# Patient Record
Sex: FEMALE | Race: BLACK OR AFRICAN AMERICAN | URBAN - METROPOLITAN AREA
[De-identification: names, ages, dates, MRNs, and addresses within clinical notes are randomized per-mention and may not be internally consistent; named-entity substitution may affect disease eponyms.]

---

## 2019-01-03 ENCOUNTER — OP HISTORICAL/CONVERTED ENCOUNTER (OUTPATIENT)
Dept: OTHER | Age: 84
End: 2019-01-03

## 2021-02-26 ENCOUNTER — APPOINTMENT (OUTPATIENT)
Dept: GENERAL RADIOLOGY | Age: 86
End: 2021-02-26
Attending: EMERGENCY MEDICINE
Payer: MEDICARE

## 2021-02-26 ENCOUNTER — HOSPITAL ENCOUNTER (EMERGENCY)
Age: 86
Discharge: HOME | End: 2021-02-26
Attending: EMERGENCY MEDICINE
Payer: MEDICARE

## 2021-02-26 VITALS
DIASTOLIC BLOOD PRESSURE: 61 MMHG | RESPIRATION RATE: 18 BRPM | HEART RATE: 69 BPM | TEMPERATURE: 98.2 F | SYSTOLIC BLOOD PRESSURE: 132 MMHG | OXYGEN SATURATION: 98 %

## 2021-02-26 DIAGNOSIS — S39.012A STRAIN OF MUSCLE, FASCIA AND TENDON OF LOWER BACK, INITIAL ENCOUNTER: ICD-10-CM

## 2021-02-26 DIAGNOSIS — G44.209 MUSCLE CONTRACTION HEADACHE: ICD-10-CM

## 2021-02-26 DIAGNOSIS — S46.812A TRAPEZIUS MUSCLE STRAIN, LEFT, INITIAL ENCOUNTER: Primary | ICD-10-CM

## 2021-02-26 LAB
ANION GAP SERPL CALC-SCNC: 8 MMOL/L (ref 5–15)
APPEARANCE UR: CLEAR
ATRIAL RATE: 67 BPM
BACTERIA URNS QL MICRO: NEGATIVE /HPF
BASOPHILS # BLD: 0 K/UL (ref 0–0.1)
BASOPHILS NFR BLD: 0 % (ref 0–1)
BILIRUB UR QL: NEGATIVE
BUN SERPL-MCNC: 12 MG/DL (ref 6–20)
BUN/CREAT SERPL: 12 (ref 12–20)
CA-I BLD-MCNC: 9.3 MG/DL (ref 8.5–10.1)
CALCULATED P AXIS, ECG09: 63 DEGREES
CALCULATED R AXIS, ECG10: -26 DEGREES
CALCULATED T AXIS, ECG11: -8 DEGREES
CHLORIDE SERPL-SCNC: 106 MMOL/L (ref 97–108)
CO2 SERPL-SCNC: 27 MMOL/L (ref 21–32)
COLOR UR: NORMAL
CREAT SERPL-MCNC: 0.97 MG/DL (ref 0.55–1.02)
DIAGNOSIS, 93000: NORMAL
DIFFERENTIAL METHOD BLD: NORMAL
EOSINOPHIL # BLD: 0 K/UL (ref 0–0.4)
EOSINOPHIL NFR BLD: 0 % (ref 0–7)
ERYTHROCYTE [DISTWIDTH] IN BLOOD BY AUTOMATED COUNT: 14.5 % (ref 11.5–14.5)
GLUCOSE BLD STRIP.AUTO-MCNC: 90 MG/DL (ref 65–100)
GLUCOSE SERPL-MCNC: 94 MG/DL (ref 65–100)
GLUCOSE UR STRIP.AUTO-MCNC: NEGATIVE MG/DL
HCT VFR BLD AUTO: 41.3 % (ref 35–47)
HGB BLD-MCNC: 12.9 G/DL (ref 11.5–16)
HGB UR QL STRIP: NEGATIVE
IMM GRANULOCYTES # BLD AUTO: 0 K/UL (ref 0–0.04)
IMM GRANULOCYTES NFR BLD AUTO: 0 % (ref 0–0.5)
KETONES UR QL STRIP.AUTO: NEGATIVE MG/DL
LEUKOCYTE ESTERASE UR QL STRIP.AUTO: NEGATIVE
LYMPHOCYTES # BLD: 1.1 K/UL (ref 0.8–3.5)
LYMPHOCYTES NFR BLD: 24 % (ref 12–49)
MCH RBC QN AUTO: 28.4 PG (ref 26–34)
MCHC RBC AUTO-ENTMCNC: 31.2 G/DL (ref 30–36.5)
MCV RBC AUTO: 91 FL (ref 80–99)
MONOCYTES # BLD: 0.4 K/UL (ref 0–1)
MONOCYTES NFR BLD: 9 % (ref 5–13)
NEUTS SEG # BLD: 3 K/UL (ref 1.8–8)
NEUTS SEG NFR BLD: 67 % (ref 32–75)
NITRITE UR QL STRIP.AUTO: NEGATIVE
NRBC # BLD: 0 K/UL (ref 0–0.01)
NRBC BLD-RTO: 0 PER 100 WBC
P-R INTERVAL, ECG05: 148 MS
PERFORMED BY, TECHID: NORMAL
PH UR STRIP: 7 [PH] (ref 5–8)
PLATELET # BLD AUTO: 215 K/UL (ref 150–400)
PMV BLD AUTO: 10.1 FL (ref 8.9–12.9)
POTASSIUM SERPL-SCNC: 4.1 MMOL/L (ref 3.5–5.1)
PROT UR STRIP-MCNC: NEGATIVE MG/DL
Q-T INTERVAL, ECG07: 432 MS
QRS DURATION, ECG06: 130 MS
QTC CALCULATION (BEZET), ECG08: 456 MS
RBC # BLD AUTO: 4.54 M/UL (ref 3.8–5.2)
RBC #/AREA URNS HPF: NORMAL /HPF (ref 0–5)
SODIUM SERPL-SCNC: 141 MMOL/L (ref 136–145)
SP GR UR REFRACTOMETRY: <1.005 (ref 1–1.03)
UROBILINOGEN UR QL STRIP.AUTO: 0.1 EU/DL (ref 0.1–1)
VENTRICULAR RATE, ECG03: 67 BPM
WBC # BLD AUTO: 4.5 K/UL (ref 3.6–11)
WBC URNS QL MICRO: NORMAL /HPF (ref 0–4)

## 2021-02-26 PROCEDURE — 74018 RADEX ABDOMEN 1 VIEW: CPT

## 2021-02-26 PROCEDURE — 96374 THER/PROPH/DIAG INJ IV PUSH: CPT

## 2021-02-26 PROCEDURE — 82962 GLUCOSE BLOOD TEST: CPT

## 2021-02-26 PROCEDURE — 85025 COMPLETE CBC W/AUTO DIFF WBC: CPT

## 2021-02-26 PROCEDURE — 93005 ELECTROCARDIOGRAM TRACING: CPT

## 2021-02-26 PROCEDURE — 74011250636 HC RX REV CODE- 250/636: Performed by: EMERGENCY MEDICINE

## 2021-02-26 PROCEDURE — A9270 NON-COVERED ITEM OR SERVICE: HCPCS | Performed by: EMERGENCY MEDICINE

## 2021-02-26 PROCEDURE — 74011250637 HC RX REV CODE- 250/637: Performed by: EMERGENCY MEDICINE

## 2021-02-26 PROCEDURE — 36415 COLL VENOUS BLD VENIPUNCTURE: CPT

## 2021-02-26 PROCEDURE — 74011636637 HC RX REV CODE- 636/637: Performed by: EMERGENCY MEDICINE

## 2021-02-26 PROCEDURE — 80048 BASIC METABOLIC PNL TOTAL CA: CPT

## 2021-02-26 PROCEDURE — 99285 EMERGENCY DEPT VISIT HI MDM: CPT

## 2021-02-26 PROCEDURE — 81003 URINALYSIS AUTO W/O SCOPE: CPT

## 2021-02-26 PROCEDURE — 71045 X-RAY EXAM CHEST 1 VIEW: CPT

## 2021-02-26 RX ORDER — HYDROCHLOROTHIAZIDE 25 MG/1
25 TABLET ORAL DAILY
COMMUNITY

## 2021-02-26 RX ORDER — OXYCODONE HYDROCHLORIDE 5 MG/1
2.5 TABLET ORAL
Qty: 12 TAB | Refills: 0 | Status: SHIPPED | OUTPATIENT
Start: 2021-02-26 | End: 2021-03-01

## 2021-02-26 RX ORDER — KETOROLAC TROMETHAMINE 15 MG/ML
15 INJECTION, SOLUTION INTRAMUSCULAR; INTRAVENOUS ONCE
Status: COMPLETED | OUTPATIENT
Start: 2021-02-26 | End: 2021-02-26

## 2021-02-26 RX ORDER — ADHESIVE BANDAGE
15 BANDAGE TOPICAL
Qty: 100 ML | Refills: 1 | Status: SHIPPED | OUTPATIENT
Start: 2021-02-26

## 2021-02-26 RX ORDER — AMLODIPINE BESYLATE 10 MG/1
10 TABLET ORAL DAILY
COMMUNITY

## 2021-02-26 RX ORDER — ACETAMINOPHEN 500 MG
1000 TABLET ORAL ONCE
Status: COMPLETED | OUTPATIENT
Start: 2021-02-26 | End: 2021-02-26

## 2021-02-26 RX ORDER — PREDNISONE 20 MG/1
20 TABLET ORAL ONCE
Status: COMPLETED | OUTPATIENT
Start: 2021-02-26 | End: 2021-02-26

## 2021-02-26 RX ORDER — DONEPEZIL HYDROCHLORIDE 10 MG/1
10 TABLET, FILM COATED ORAL
COMMUNITY

## 2021-02-26 RX ADMIN — KETOROLAC TROMETHAMINE 15 MG: 15 INJECTION, SOLUTION INTRAMUSCULAR; INTRAVENOUS at 17:51

## 2021-02-26 RX ADMIN — ACETAMINOPHEN 1000 MG: 500 TABLET ORAL at 17:51

## 2021-02-26 RX ADMIN — PREDNISONE 20 MG: 20 TABLET ORAL at 17:51

## 2021-02-26 NOTE — ED TRIAGE NOTES
Pt arrived to ED via EMS from home with family reporting pt has headache and back pain. Pt only complaint is lower back pain x1 week. History of dementia. Poor historian.

## 2021-02-26 NOTE — ED PROVIDER NOTES
EMERGENCY DEPARTMENT HISTORY AND PHYSICAL EXAM        Date: 2/26/2021  Patient Name: Serenity Trinidad    History of Presenting Illness     Chief Complaint   Patient presents with    Headache    Back Pain       5:02 PM    History Provided By: Patient    HPI: Serenity Trinidad, 80 y.o. female with a history of dementia presents to the ED via EMS c/o HA that began 2-3 days ago. Pt endorses that the pain is worsened with palpations and turning her head. Pt also reports back pain that worsened when turning/rolling over, but it is tolerable while laying flat. Pt denies any falls. HPI/ROS limited due to dementia. Family 9(granddaughter) states that pt originally complained of some neck discomfort 3 days ago. Pt was given OTC tylenol x2 and heating pad was applied. Patient brought in today as she was complaining of more severe pain and discomfort with movement of her head and rolling over. Granddaughter denies any history of trauma or falls. [address] daughter's phone number is [phone]    PCP: UNKNOWN    Current Outpatient Medications   Medication Sig Dispense Refill    amLODIPine (NORVASC) 10 mg tablet Take 10 mg by mouth daily.  hydroCHLOROthiazide (HYDRODIURIL) 25 mg tablet Take 25 mg by mouth daily.  donepeziL (ARICEPT) 10 mg tablet Take 10 mg by mouth nightly.  oxyCODONE IR (Roxicodone) 5 mg immediate release tablet Take 0.5 Tabs by mouth nightly as needed for Pain for up to 3 days. Max Daily Amount: 2.5 mg. 12 Tab 0    magnesium hydroxide (Dulcolax, magnesium hydroxide,) 400 mg/5 mL suspension Take 15 mL by mouth nightly. Whenever pt takes a dose of oxycodone for pain. Indications: constipation 100 mL 1       Past History     Past Medical History:  No past medical history on file. Past Surgical History:  No past surgical history on file. Family History:  No family history on file.     Social History:  Social History     Tobacco Use    Smoking status: Not on file   Substance Use Topics    Alcohol use: Not on file    Drug use: Not on file       Allergies:  No Known Allergies    Review of Systems   Review of Systems   Unable to perform ROS: Dementia   Musculoskeletal: Positive for back pain. Neurological: Positive for headaches. Physical Exam   Physical Exam  Constitutional:       General: She is not in acute distress. Appearance: She is well-developed. She is not ill-appearing. Comments: well developed very well kept elderly AA female a/o to person and place. complaining of head and upper back pain. HENT:      Head: Normocephalic and atraumatic. Comments: Tenderness to the scalp and forehead area, no step offs, hematoma, or contusion,      Nose: Nose normal.      Mouth/Throat:      Mouth: Mucous membranes are moist.      Pharynx: No posterior oropharyngeal erythema. Eyes:      General: Vision grossly intact. Extraocular Movements: Extraocular movements intact. Conjunctiva/sclera: Conjunctivae normal.      Pupils: Pupils are equal, round, and reactive to light. Neck:      Musculoskeletal: Neck supple. Vascular: No JVD. Trachea: No tracheal deviation. Comments: no midline tenderness of the neck but tenderness to the bilateral cervical trapezius extending through out the entire trapezius muscle through out her back   Cardiovascular:      Rate and Rhythm: Normal rate and regular rhythm. Pulses: Normal pulses. Carotid pulses are 2+ on the right side and 2+ on the left side. Radial pulses are 2+ on the right side and 2+ on the left side. Femoral pulses are 2+ on the right side and 2+ on the left side. Popliteal pulses are 2+ on the right side and 2+ on the left side. Dorsalis pedis pulses are 2+ on the right side and 2+ on the left side. Posterior tibial pulses are 2+ on the right side and 2+ on the left side. Heart sounds: Normal heart sounds.    Pulmonary:      Effort: Pulmonary effort is normal. Breath sounds: Normal breath sounds and air entry. No wheezing or rhonchi. Abdominal:      General: Bowel sounds are normal.      Palpations: Abdomen is soft. Tenderness: There is no abdominal tenderness. There is no guarding or rebound. Musculoskeletal:         General: No swelling or deformity. Right shoulder: She exhibits normal range of motion and no swelling. Right lower leg: No edema. Left lower leg: No edema. Comments: tenderness to the bilateral cervical trapezius extending through out the entire trapezius muscle to her back. No bruising or contusions, mild tenderness to the right external oblique insertion to the iliac crest.    Skin:     General: Skin is warm and dry. Capillary Refill: Capillary refill takes less than 2 seconds. Findings: No signs of injury or rash. Neurological:      General: No focal deficit present. Mental Status: She is alert. Comments: 5/5 strength in all extremities, sensations intact   Psychiatric:         Behavior: Behavior is cooperative. Diagnostic Study Results     Labs -     Recent Results (from the past 48 hour(s))   CBC WITH AUTOMATED DIFF    Collection Time: 02/26/21  3:25 PM   Result Value Ref Range    WBC 4.5 3.6 - 11.0 K/uL    RBC 4.54 3.80 - 5.20 M/uL    HGB 12.9 11.5 - 16.0 g/dL    HCT 41.3 35.0 - 47.0 %    MCV 91.0 80.0 - 99.0 FL    MCH 28.4 26.0 - 34.0 PG    MCHC 31.2 30.0 - 36.5 g/dL    RDW 14.5 11.5 - 14.5 %    PLATELET 194 331 - 921 K/uL    MPV 10.1 8.9 - 12.9 FL    NRBC 0.0 0  WBC    ABSOLUTE NRBC 0.00 0.00 - 0.01 K/uL    NEUTROPHILS 67 32 - 75 %    LYMPHOCYTES 24 12 - 49 %    MONOCYTES 9 5 - 13 %    EOSINOPHILS 0 0 - 7 %    BASOPHILS 0 0 - 1 %    IMMATURE GRANULOCYTES 0 0.0 - 0.5 %    ABS. NEUTROPHILS 3.0 1.8 - 8.0 K/UL    ABS. LYMPHOCYTES 1.1 0.8 - 3.5 K/UL    ABS. MONOCYTES 0.4 0.0 - 1.0 K/UL    ABS. EOSINOPHILS 0.0 0.0 - 0.4 K/UL    ABS. BASOPHILS 0.0 0.0 - 0.1 K/UL    ABS. IMM.  GRANS. 0.0 0.00 - 0.04 K/UL    DF AUTOMATED     METABOLIC PANEL, BASIC    Collection Time: 02/26/21  3:25 PM   Result Value Ref Range    Sodium 141 136 - 145 mmol/L    Potassium 4.1 3.5 - 5.1 mmol/L    Chloride 106 97 - 108 mmol/L    CO2 27 21 - 32 mmol/L    Anion gap 8 5 - 15 mmol/L    Glucose 94 65 - 100 mg/dL    BUN 12 6 - 20 mg/dL    Creatinine 0.97 0.55 - 1.02 mg/dL    BUN/Creatinine ratio 12 12 - 20      GFR est AA >60 >60 ml/min/1.73m2    GFR est non-AA 54 (L) >60 ml/min/1.73m2    Calcium 9.3 8.5 - 10.1 mg/dL   URINALYSIS W/ RFLX MICROSCOPIC    Collection Time: 02/26/21  4:20 PM   Result Value Ref Range    Color Yellow/Straw      Appearance Clear Clear      Specific gravity <1.005 1.003 - 1.030    pH (UA) 7.0 5.0 - 8.0      Protein Negative Negative mg/dL    Glucose Negative Negative mg/dL    Ketone Negative Negative mg/dL    Bilirubin Negative Negative      Blood Negative Negative      Urobilinogen 0.1 0.1 - 1.0 EU/dL    Nitrites Negative Negative      Leukocyte Esterase Negative Negative      WBC 0-4 0 - 4 /hpf    RBC 0-5 0 - 5 /hpf    Bacteria Negative Negative /hpf   EKG, 12 LEAD, INITIAL    Collection Time: 02/26/21  5:00 PM   Result Value Ref Range    Ventricular Rate 67 BPM    Atrial Rate 67 BPM    P-R Interval 148 ms    QRS Duration 130 ms    Q-T Interval 432 ms    QTC Calculation (Bezet) 456 ms    Calculated P Axis 63 degrees    Calculated R Axis -26 degrees    Calculated T Axis -8 degrees    Diagnosis       Normal sinus rhythm  Right bundle branch block  Moderate voltage criteria for LVH, may be normal variant  Cannot rule out Septal infarct (cited on or before 21-DEC-2016)  Abnormal ECG  When compared with ECG of 21-DEC-2016 10:08,  Premature ventricular complexes are no longer Present  Abberant conduction is no longer Present  Questionable change in initial forces of Septal leads  Confirmed by Rhonda Keating MD, Clark Espino ([id number]) on 2/26/2021 5:28:04 PM         Radiologic Studies -   XR ABD (KUB)   Final Result   1.   There is moderate fecal loading within the colon related to recent   constipation. 2.  This examination is negative for free intra-abdominal air. 3.  There are extensive vascular calcifications within the pelvic region. XR CHEST PORT    (Results Pending)     CT Results  (Last 48 hours)    None        CXR Results  (Last 48 hours)    None          Medical Decision Making and ED Course     I have also reviewed the vital signs, available nursing notes, past medical history, past surgical history, family history and social history. Vital Signs - Reviewed the patient's vital signs. Patient Vitals for the past 12 hrs:   Temp Pulse Resp BP SpO2   02/26/21 1754  69 18 132/61 98 %   02/26/21 1656  77 16 (!) 153/54 98 %   02/26/21 1513 98.2 °F (36.8 °C) 82 16 (!) 144/64 99 %       EKG interpretation: (Preliminary): Performed at 1700, and read at 1700  Rhythm: normal sinus rhythm; and regular . Rate (approx.): 67; Axis: normal; Other findings: left ventricular hypertrophy and RBBB. Records Reviewed: Nursing Notes, Old medical records, Ambulance Run Sheet, Previous Radiology Studies and Previous Laboratory Studies as available. Medical Decision Making/Diff Dx:  muscle strain, UTI, contusion, electrolyte abnormality, dehydration by clinical exam, pneumonia, CVA, TIA, intercranial injury, contention headache, muscle contraction headache    Adorable 63-year-old -American female obviously well-kept and cared for by her family physical exam demonstrates evidence of trapezius muscle pain with mild swelling and muscle contraction tension headache associated with that. We will do screening labs medicate for comfort and disposition per results    ED course/Re-evaluation/Consultations/Other     ED Course as of Feb 26 1848 Fri Feb 26, 2021   1714 Called pt's family for additional history and update with pt's status. Pt's sister denies any fall or trauma.  Pt had neck discomfort 3 days ago and pt was given OTC tylenolx2. Sister is also giving a heating pad per the suggestion of a family friend. Discussed with family that heating pad is likely exacerbating pt's pain. [RD]      ED Course User Index  [RD] Melina Prior       Procedures     N/A    Disposition     Discharged    DISCHARGE PLAN:  1. Current Discharge Medication List      START taking these medications    Details   oxyCODONE IR (Roxicodone) 5 mg immediate release tablet Take 0.5 Tabs by mouth nightly as needed for Pain for up to 3 days. Max Daily Amount: 2.5 mg.  Qty: 12 Tab, Refills: 0    Associated Diagnoses: Trapezius muscle strain, left, initial encounter; Muscle contraction headache; Strain of muscle, fascia and tendon of lower back, initial encounter      magnesium hydroxide (Dulcolax, magnesium hydroxide,) 400 mg/5 mL suspension Take 15 mL by mouth nightly. Whenever pt takes a dose of oxycodone for pain. Indications: constipation  Qty: 100 mL, Refills: 1           2. There are no discharge medications for this patient. 3.   Follow-up Information    None       4. Stop heating pads for the next 3 days to allow the swelling and inflammation to resolve to the strained areas.     5. Use the following medications for pain management:     **Tylenol 2 tabs every 8 hours where or not she is complaining of pain.     **Ibuprofen 2 tabs every 8 hours for the next 3 days;     **Oxycodone 1/2 tablet at bed time.     6. Contact you primary care physician for follow-up in 1 week.     7. Return to the ED as needed or if you become concerned. Diagnosis     Clinical impression:   1. Trapezius muscle strain, left, initial encounter    2. Muscle contraction headache    3. Strain of muscle, fascia and tendon of lower back, initial encounter           Attestation:  I, [Mayank Paniagua] am scribing for, and in the presence of Dr. Lorna Streeter MD]   I, Dr Lorna Streeter MD], have reviewed any and all documentation by a scribe and authenticated its accuracy.

## 2021-02-26 NOTE — DISCHARGE INSTRUCTIONS
Stop heating pads for the next 3 days to allow the swelling and inflammation to resolve to the strained areas.    Use the following medications for pain management:    **Tylenol 2 tabs every 8 hours where or not she is complaining of pain.    **Ibuprofen 2 tabs every 8 hours for the next 3 days;    **Oxycodone 1/2 tablet at bed time.    Contact you primary care physician for follow-up in 1 week.    Return to the ED as needed or if you become concerned.